# Patient Record
Sex: MALE | Race: WHITE | URBAN - METROPOLITAN AREA
[De-identification: names, ages, dates, MRNs, and addresses within clinical notes are randomized per-mention and may not be internally consistent; named-entity substitution may affect disease eponyms.]

---

## 2024-03-08 ENCOUNTER — HOSPITAL ENCOUNTER (OUTPATIENT)
Dept: PHYSICAL THERAPY | Age: 79
Setting detail: THERAPIES SERIES
Discharge: HOME | End: 2024-03-08
Payer: MEDICARE

## 2024-03-08 DIAGNOSIS — Z78.9 DECREASED ACTIVITIES OF DAILY LIVING (ADL): Primary | ICD-10-CM

## 2024-03-08 DIAGNOSIS — M54.9 BACK PAIN, UNSPECIFIED BACK LOCATION, UNSPECIFIED BACK PAIN LATERALITY, UNSPECIFIED CHRONICITY: ICD-10-CM

## 2024-03-08 PROCEDURE — 97110 THERAPEUTIC EXERCISES: CPT

## 2024-03-08 PROCEDURE — 97161 PT EVAL LOW COMPLEX 20 MIN: CPT

## 2024-03-08 NOTE — PLAN OF CARE
surgeries        Other conditions  [] Vertigo  [] Syncope  [] Kidney Failure  [] Cancer  [] Pregnancy  [] Incontinence   Other Co-morbidities not listed: prostate and bladder surgery      OBJECTIVE EXAMINATION     3/8/24  ROM/Strength: (Blank cells denote NT)      Mvmt (norm) AROM L AROM R Notes PROM L PROM R Notes               LUMBAR Flex (90)         Ext (25)         SB (25)          Rotation (30)                       HIP Flex (120)          Abd (45)          ER (50)          IR (45)          Ext (20)                   KNEE Flex (140)          Ext (0)                     ANKLE DF (20)          PF (50)          Inversion (30)          Eversion (20)             MMT L          MMT  R Notes     LUMBAR  Flexion       Extension       Lateral flexion        Rotation          MMT L MMT R Notes       HIP  Flexion 4-/5 4-/5     ADD 3+/5 3-/5      Abduction 4-/5 4-/5      ER        IR        Extension             KNEE  Flexion 4+/5 4+/5      Extension 4/5 4/5             ANKLE  DF 5/5 5/5      PF 5/5 5/5      Inversion 5/5 5/5      Eversion 5/5 5/5      Repeated Movements: [] Normal  [] Abnormal [] N/A      Reflexes: Abnormal findings listed below  All reflexes WNL (2+); (-) clonus      Balance:  [x] WNL      [] NT       [] Dysfunction noted  Comment:     Falls Risk Assessment (30 days):   Falls Risk assessed and patient requires intervention due to being higher risk   Time Up and Go (TUG):   Not Assessed  21-22 seconds (At least 80% but less than 100% functional limitation) - 21 sec  Tinetti balance 9, gait 3, total 12/28       Exercises/Interventions     Therapeutic Ex (56665)  resistance Sets/time Reps Notes/Cues/Progressions                                                                         Manual Intervention (31868)  TIME                                        NMR re-education (63628) resistance Sets/time Reps CUES NEEDED                                      Therapeutic Activity (89336)  Sets/time

## 2024-03-12 ENCOUNTER — HOSPITAL ENCOUNTER (OUTPATIENT)
Dept: PHYSICAL THERAPY | Age: 79
Setting detail: THERAPIES SERIES
Discharge: HOME | End: 2024-03-12
Payer: MEDICARE

## 2024-03-12 PROCEDURE — 97110 THERAPEUTIC EXERCISES: CPT

## 2024-03-12 PROCEDURE — 97112 NEUROMUSCULAR REEDUCATION: CPT

## 2024-03-12 NOTE — FLOWSHEET NOTE
stayed.\"  Pt stated his back pain contributes to B knee pain- \"If my knee hurts and I straighten my back my knee pain goes away.\"  Pain also is relieved by \"rotating my hip which snaps my back.\"  Pt has difficulty climbing stairs and walking down steps, and the distance pt can walk on flat surfaces is limited.  \"I don't go heel to toe when I walk because of my back.  It affects my balance also... because pain in my knee... my knee sometimes will just give out and it will cause me to fall.\"  Pt falls \"very seldom because I'm very careful in what I do.\"  \"I'll stop what I'm doing if my knees hurt.\"  3/12: [place] good when he left PT, but as the day went on the pain came back       Test used Initial score  3/8/24 03/12/2024   Pain Summary VAS 4/10    Functional questionnaire [place] Back Pain Disability Scale 39    Other:          OBJECTIVE EXAMINATION     3/8/24  ROM/Strength: (Blank cells denote NT)      Mvmt (norm) AROM L AROM R Notes PROM L PROM R Notes               LUMBAR Flex (90)         Ext (25)         SB (25)          Rotation (30)                       HIP Flex (120)          Abd (45)          ER (50)          IR (45)          Ext (20)                   KNEE Flex (140)          Ext (0)                     ANKLE DF (20)          PF (50)          Inversion (30)          Eversion (20)               MMT L MMT R Notes       HIP  Flexion 4-/5 4-/5     ADD 3+/5 3-/5      Abduction 4-/5 4-/5      ER        IR        Extension             KNEE  Flexion 4+/5 4+/5      Extension 4/5 4/5               Exercises/Interventions     Therapeutic Ex (68327)  resistance Sets/time Reps Notes/Cues/Progressions          Nustep L1 5'     Hamstring stretch (seated)  30\" 1 cues   Gastroc incline  30\" 2                                              Manual Intervention (21539)  TIME     STM B SI  3'  Declined ever having tenderness in LB. States it is his knees that hurt   Manual Hamstring stretch in supine  30\" 2

## 2024-03-14 ENCOUNTER — HOSPITAL ENCOUNTER (OUTPATIENT)
Dept: PHYSICAL THERAPY | Age: 79
Setting detail: THERAPIES SERIES
Discharge: HOME | End: 2024-03-14
Payer: MEDICARE

## 2024-03-14 PROCEDURE — 97140 MANUAL THERAPY 1/> REGIONS: CPT

## 2024-03-14 PROCEDURE — 97110 THERAPEUTIC EXERCISES: CPT

## 2024-03-14 PROCEDURE — 97112 NEUROMUSCULAR REEDUCATION: CPT

## 2024-03-14 NOTE — FLOWSHEET NOTE
PLAN     Plan: Cont POC- Continue emphasis/focus on improving proper muscle recruitment and activation/motor control patterns, increasing ROM, static and dynamic balance, and kinesthetic sense and proprioception. Next visit plan to progress balance, continue current phase, and strengthening exercises     Electronically Signed by Nic Hernandez, PT  Date: 03/14/2024     Note: Portions of this note have been templated and/or copied from initial evaluation, reassessments and prior notes for documentation efficiency.

## 2024-03-18 ENCOUNTER — HOSPITAL ENCOUNTER (OUTPATIENT)
Dept: PHYSICAL THERAPY | Age: 79
Setting detail: THERAPIES SERIES
Discharge: HOME | End: 2024-03-18
Payer: MEDICARE

## 2024-03-18 PROCEDURE — 97112 NEUROMUSCULAR REEDUCATION: CPT

## 2024-03-18 PROCEDURE — 97110 THERAPEUTIC EXERCISES: CPT

## 2024-03-18 PROCEDURE — 97140 MANUAL THERAPY 1/> REGIONS: CPT

## 2024-03-18 NOTE — FLOWSHEET NOTE
[facility]- Outpatient Rehabilitation and Therapy [address] [place] [address] office: [phone] fax: [phone]         Physical Therapy: TREATMENT/PROGRESS NOTE   Patient: Estuardo Natarajan Jr (78 y.o. male)   Examination Date: 2024   :  1945 MRN: [id number]   Visit #: 4   Insurance Allowable Auth Needed   25 visits   3/8/24 - 24  Ok to treat, per Teddy [x]Yes    []No    Insurance: Payor: HUMANA MEDICARE / Plan: HUMANA GOLD PLUS HMO / Product Type: *No Product type* /   Insurance ID: [id number] - (Medicare Managed)  Secondary Insurance (if applicable):    Treatment Diagnosis:     ICD-10-CM    1. Decreased activities of daily living (ADL)  Z78.9       2. Back pain, unspecified back location, unspecified back pain laterality, unspecified chronicity  M54.9          Medical Diagnosis:  DDD, lumbar (M51.36)   Referring Physician: Justin Marsh MD  PCP: Brett Edgar       Plan of care signed (Y/N): N    Date of Patient follow up with Physician:      Progress Report/POC: NO  POC update due: (10 visits /OR AUTH LIMITS, whichever is less)  2024                                             Precautions/ Contra-indications:           Latex allergy:  NO  Pacemaker:    NO  Contraindications for Manipulation: NA  Date of Surgery: NA  Other:    Red Flags:  None    C-SSRS Triggered by Intake questionnaire:   [x] No, Questionnaire did not trigger screening.   [] Yes, Patient intake triggered further evaluation      [] C-SSRS Screening completed  [] PCP notified via Plan of Care  [] Emergency services notified     Preferred Language for Healthcare:   [x] English       [] other:    SUBJECTIVE EXAMINATION     Patient stated complaint: Pt has a 4 year history of low back pain.  Pain is located at B SIJ region.  Pt stated he was walking a dog and was tripped over another dog that ran between his legs \"and I got over that, that pain went away.  But then the same thing happened about 2

## 2024-03-21 ENCOUNTER — HOSPITAL ENCOUNTER (OUTPATIENT)
Dept: PHYSICAL THERAPY | Age: 79
Setting detail: THERAPIES SERIES
Discharge: HOME | End: 2024-03-21
Payer: MEDICARE

## 2024-03-21 PROCEDURE — 97110 THERAPEUTIC EXERCISES: CPT

## 2024-03-21 PROCEDURE — 97140 MANUAL THERAPY 1/> REGIONS: CPT

## 2024-03-21 PROCEDURE — 97112 NEUROMUSCULAR REEDUCATION: CPT

## 2024-03-21 NOTE — FLOWSHEET NOTE
[facility]- Outpatient Rehabilitation and Therapy [address] [place] [address] office: [phone] fax: [phone]         Physical Therapy: TREATMENT/PROGRESS NOTE   Patient: Estuardo Natarajan Jr (78 y.o. male)   Examination Date: 2024   :  1945 MRN: [id number]   Visit #: 5   Insurance Allowable Auth Needed   25 visits   3/8/24 - 24  Ok to treat, per Teddy [x]Yes    []No    Insurance: Payor: HUMANA MEDICARE / Plan: HUMANA GOLD PLUS HMO / Product Type: *No Product type* /   Insurance ID: [id number] - (Medicare Managed)  Secondary Insurance (if applicable):    Treatment Diagnosis:     ICD-10-CM    1. Decreased activities of daily living (ADL)  Z78.9       2. Back pain, unspecified back location, unspecified back pain laterality, unspecified chronicity  M54.9          Medical Diagnosis:  DDD, lumbar (M51.36)   Referring Physician: Justin Marsh MD  PCP: Brett Edgar       Plan of care signed (Y/N): N    Date of Patient follow up with Physician:      Progress Report/POC: NO  POC update due: (10 visits /OR AUTH LIMITS, whichever is less)  2024                                             Precautions/ Contra-indications:           Latex allergy:  NO  Pacemaker:    NO  Contraindications for Manipulation: NA  Date of Surgery: NA  Other:    Red Flags:  None    C-SSRS Triggered by Intake questionnaire:   [x] No, Questionnaire did not trigger screening.   [] Yes, Patient intake triggered further evaluation      [] C-SSRS Screening completed  [] PCP notified via Plan of Care  [] Emergency services notified     Preferred Language for Healthcare:   [x] English       [] other:    SUBJECTIVE EXAMINATION     Patient stated complaint: Pt has a 4 year history of low back pain.  Pain is located at B SIJ region.  Pt stated he was walking a dog and was tripped over another dog that ran between his legs \"and I got over that, that pain went away.  But then the same thing happened about 2

## 2024-03-26 ENCOUNTER — HOSPITAL ENCOUNTER (OUTPATIENT)
Dept: PHYSICAL THERAPY | Age: 79
Setting detail: THERAPIES SERIES
Discharge: HOME | End: 2024-03-26
Payer: MEDICARE

## 2024-03-26 PROCEDURE — 97110 THERAPEUTIC EXERCISES: CPT

## 2024-03-26 PROCEDURE — 97112 NEUROMUSCULAR REEDUCATION: CPT

## 2024-03-26 PROCEDURE — 97140 MANUAL THERAPY 1/> REGIONS: CPT

## 2024-03-26 NOTE — FLOWSHEET NOTE
[facility]- Outpatient Rehabilitation and Therapy [address] [place] [address] office: [phone] fax: [phone]         Physical Therapy: TREATMENT/PROGRESS NOTE   Patient: Estuardo Natarajan Jr (78 y.o. male)   Examination Date: 2024   :  1945 MRN: [id number]   Visit #: 6   Insurance Allowable Auth Needed   25 visits   3/8/24 - 24  Ok to treat, per Teddy [x]Yes    []No    Insurance: Payor: HUMANA MEDICARE / Plan: HUMANA GOLD PLUS HMO / Product Type: *No Product type* /   Insurance ID: [id number] - (Medicare Managed)  Secondary Insurance (if applicable):    Treatment Diagnosis:     ICD-10-CM    1. Decreased activities of daily living (ADL)  Z78.9       2. Back pain, unspecified back location, unspecified back pain laterality, unspecified chronicity  M54.9          Medical Diagnosis:  DDD, lumbar (M51.36)   Referring Physician: Justin Marsh MD  PCP: Brett Edgar       Plan of care signed (Y/N): N    Date of Patient follow up with Physician:      Progress Report/POC: NO  POC update due: (10 visits /OR AUTH LIMITS, whichever is less)  2024                                             Precautions/ Contra-indications:           Latex allergy:  NO  Pacemaker:    NO  Contraindications for Manipulation: NA  Date of Surgery: NA  Other:    Red Flags:  None    C-SSRS Triggered by Intake questionnaire:   [x] No, Questionnaire did not trigger screening.   [] Yes, Patient intake triggered further evaluation      [] C-SSRS Screening completed  [] PCP notified via Plan of Care  [] Emergency services notified     Preferred Language for Healthcare:   [x] English       [] other:    SUBJECTIVE EXAMINATION     Patient stated complaint: Pt has a 4 year history of low back pain.  Pain is located at B SIJ region.  Pt stated he was walking a dog and was tripped over another dog that ran between his legs \"and I got over that, that pain went away.  But then the same thing happened about 2

## 2024-03-28 ENCOUNTER — HOSPITAL ENCOUNTER (OUTPATIENT)
Dept: PHYSICAL THERAPY | Age: 79
Setting detail: THERAPIES SERIES
Discharge: HOME | End: 2024-03-28
Payer: MEDICARE

## 2024-03-28 PROCEDURE — 97140 MANUAL THERAPY 1/> REGIONS: CPT

## 2024-03-28 PROCEDURE — 97110 THERAPEUTIC EXERCISES: CPT

## 2024-03-28 PROCEDURE — 97112 NEUROMUSCULAR REEDUCATION: CPT

## 2024-03-28 PROCEDURE — 97530 THERAPEUTIC ACTIVITIES: CPT

## 2024-03-28 NOTE — FLOWSHEET NOTE
[facility]- Outpatient Rehabilitation and Therapy [address] [place] [address] office: [phone] fax: [phone]         Physical Therapy: TREATMENT/PROGRESS NOTE   Patient: Estuardo Natarajan Jr (78 y.o. male)   Examination Date: 2024   :  1945 MRN: [id number]   Visit #: 7   Insurance Allowable Auth Needed   25 visits   3/8/24 - 24  Ok to treat, per Teddy [x]Yes    []No    Insurance: Payor: HUMANA MEDICARE / Plan: HUMANA GOLD PLUS HMO / Product Type: *No Product type* /   Insurance ID: [id number] - (Medicare Managed)  Secondary Insurance (if applicable):    Treatment Diagnosis:     ICD-10-CM    1. Decreased activities of daily living (ADL)  Z78.9       2. Back pain, unspecified back location, unspecified back pain laterality, unspecified chronicity  M54.9          Medical Diagnosis:  DDD, lumbar (M51.36)   Referring Physician: Justin Marsh MD  PCP: Brett Edgar       Plan of care signed (Y/N): N    Date of Patient follow up with Physician:      Progress Report/POC: NO  POC update due: (10 visits /OR AUTH LIMITS, whichever is less)  2024                                             Precautions/ Contra-indications:           Latex allergy:  NO  Pacemaker:    NO  Contraindications for Manipulation: NA  Date of Surgery: NA  Other:    Red Flags:  None    C-SSRS Triggered by Intake questionnaire:   [x] No, Questionnaire did not trigger screening.   [] Yes, Patient intake triggered further evaluation      [] C-SSRS Screening completed  [] PCP notified via Plan of Care  [] Emergency services notified     Preferred Language for Healthcare:   [x] English       [] other:    SUBJECTIVE EXAMINATION     Patient stated complaint: Pt has a 4 year history of low back pain.  Pain is located at B SIJ region.  Pt stated he was walking a dog and was tripped over another dog that ran between his legs \"and I got over that, that pain went away.  But then the same thing happened about 2

## 2024-04-03 ENCOUNTER — HOSPITAL ENCOUNTER (OUTPATIENT)
Dept: PHYSICAL THERAPY | Age: 79
Setting detail: THERAPIES SERIES
Discharge: HOME | End: 2024-04-03
Payer: MEDICARE

## 2024-04-03 PROCEDURE — 97140 MANUAL THERAPY 1/> REGIONS: CPT

## 2024-04-03 PROCEDURE — 97110 THERAPEUTIC EXERCISES: CPT

## 2024-04-03 PROCEDURE — 97112 NEUROMUSCULAR REEDUCATION: CPT

## 2024-04-03 PROCEDURE — 97530 THERAPEUTIC ACTIVITIES: CPT

## 2024-04-03 NOTE — FLOWSHEET NOTE
[facility]- Outpatient Rehabilitation and Therapy [address] [place] [address] office: [phone] fax: [phone]         Physical Therapy: TREATMENT/PROGRESS NOTE   Patient: Estuardo Natarajan Jr (78 y.o. male)   Examination Date: 2024   :  1945 MRN: [id number]   Visit #: 8   Insurance Allowable Auth Needed   25 visits   3/8/24 - 24  Ok to treat, per Teddy [x]Yes    []No    Insurance: Payor: HUMANA MEDICARE / Plan: HUMANA GOLD PLUS HMO / Product Type: *No Product type* /   Insurance ID: [id number] - (Medicare Managed)  Secondary Insurance (if applicable):    Treatment Diagnosis:     ICD-10-CM    1. Decreased activities of daily living (ADL)  Z78.9       2. Back pain, unspecified back location, unspecified back pain laterality, unspecified chronicity  M54.9          Medical Diagnosis:  DDD, lumbar (M51.36)   Referring Physician: Justin Marsh MD  PCP: Brett Edgar       Plan of care signed (Y/N): N    Date of Patient follow up with Physician:      Progress Report/POC: NO  POC update due: (10 visits /OR AUTH LIMITS, whichever is less)  5/3/2024                                             Precautions/ Contra-indications:           Latex allergy:  NO  Pacemaker:    NO  Contraindications for Manipulation: NA  Date of Surgery: NA  Other:    Red Flags:  None    C-SSRS Triggered by Intake questionnaire:   [x] No, Questionnaire did not trigger screening.   [] Yes, Patient intake triggered further evaluation      [] C-SSRS Screening completed  [] PCP notified via Plan of Care  [] Emergency services notified     Preferred Language for Healthcare:   [x] English       [] other:    SUBJECTIVE EXAMINATION     Patient stated complaint: Pt has a 4 year history of low back pain.  Pain is located at B SIJ region.  Pt stated he was walking a dog and was tripped over another dog that ran between his legs \"and I got over that, that pain went away.  But then the same thing happened about 2

## 2024-04-05 ENCOUNTER — HOSPITAL ENCOUNTER (OUTPATIENT)
Dept: PHYSICAL THERAPY | Age: 79
Setting detail: THERAPIES SERIES
Discharge: HOME | End: 2024-04-05
Payer: MEDICARE

## 2024-04-05 PROCEDURE — 97112 NEUROMUSCULAR REEDUCATION: CPT

## 2024-04-05 PROCEDURE — 97110 THERAPEUTIC EXERCISES: CPT

## 2024-04-05 PROCEDURE — 97530 THERAPEUTIC ACTIVITIES: CPT

## 2024-04-05 NOTE — FLOWSHEET NOTE
[facility]- Outpatient Rehabilitation and Therapy [address] [place] [address] office: [phone] fax: [phone]         Physical Therapy: TREATMENT/PROGRESS NOTE   Patient: Estuardo Natarajan Jr (78 y.o. male)   Examination Date: 2024   :  1945 MRN: [id number]   Visit #: 9   Insurance Allowable Auth Needed   25 visits   3/8/24 - 24  Ok to treat, per Teddy [x]Yes    []No    Insurance: Payor: HUMANA MEDICARE / Plan: HUMANA GOLD PLUS HMO / Product Type: *No Product type* /   Insurance ID: [id number] - (Medicare Managed)  Secondary Insurance (if applicable):    Treatment Diagnosis:     ICD-10-CM    1. Decreased activities of daily living (ADL)  Z78.9       2. Back pain, unspecified back location, unspecified back pain laterality, unspecified chronicity  M54.9          Medical Diagnosis:  DDD, lumbar (M51.36)   Referring Physician: Justin Marsh MD  PCP: Brett Edgar       Plan of care signed (Y/N): N    Date of Patient follow up with Physician:      Progress Report/POC: NO  POC update due: (10 visits /OR AUTH LIMITS, whichever is less)  5/3/2024                                             Precautions/ Contra-indications:           Latex allergy:  NO  Pacemaker:    NO  Contraindications for Manipulation: NA  Date of Surgery: NA  Other:    Red Flags:  None    C-SSRS Triggered by Intake questionnaire:   [x] No, Questionnaire did not trigger screening.   [] Yes, Patient intake triggered further evaluation      [] C-SSRS Screening completed  [] PCP notified via Plan of Care  [] Emergency services notified     Preferred Language for Healthcare:   [x] English       [] other:    SUBJECTIVE EXAMINATION     Patient stated complaint: Pt has a 4 year history of low back pain.  Pain is located at B SIJ region.  Pt stated he was walking a dog and was tripped over another dog that ran between his legs \"and I got over that, that pain went away.  But then the same thing happened about 2

## 2024-04-09 ENCOUNTER — HOSPITAL ENCOUNTER (OUTPATIENT)
Dept: PHYSICAL THERAPY | Age: 79
Setting detail: THERAPIES SERIES
Discharge: HOME | End: 2024-04-09
Payer: MEDICARE

## 2024-04-09 PROCEDURE — 97110 THERAPEUTIC EXERCISES: CPT

## 2024-04-09 PROCEDURE — 97530 THERAPEUTIC ACTIVITIES: CPT

## 2024-04-09 PROCEDURE — 97112 NEUROMUSCULAR REEDUCATION: CPT

## 2024-04-09 NOTE — FLOWSHEET NOTE
cueing for activities related to strengthening, flexibility, endurance, ROM performed to prevent loss of range of motion, maintain or improve muscular strength or increase flexibility, following either an injury or surgery.   (67515) NEUROMUSCULAR RE-EDUCATION - Therapeutic procedure, 1 or more areas, each 15 minutes; neuromuscular reeducation of movement, balance, coordination, kinesthetic sense, posture, and/or proprioception for sitting and/or standing activities  (49699) THERAPEUTIC ACTIVITY - use of dynamic activities to improve functional performance. (Ex include squatting, ascending/descending stairs, walking, bending, lifting, catching, throwing, pushing, pulling, jumping.)  Direct, one on one contact, billed in 15-minute increments.  (90923) MANUAL THERAPY -  Manual therapy techniques, 1 or more regions, each 15 minutes (Mobilization/manipulation, manual lymphatic drainage, manual traction) for the purpose of modulating pain, promoting relaxation,  increasing ROM, reducing/eliminating soft tissue swelling/inflammation/restriction, improving soft tissue extensibility and allowing for proper ROM for normal function with self care, mobility, lifting and ambulation  (00797) MECHANICAL TRACTION      GOALS     Patient stated goal: \"walking, climbing stairs\"  Status:  [] Progressing: [] Met: [] Not Met: [] Adjusted    Therapist goals for Patient:   Short Term Goals: To be achieved in: 2 weeks  Independent in HEP and progression per patient tolerance, in order to progress toward full function and prevent re-injury.    Status: [] Progressing: [] Met: [] Not Met: [] Adjusted  Patient will have a decrease in pain to 3/10 to help facilitate improvement in movement, function, and ADLs as indicated by functional deficits.   Status: [] Progressing: [] Met: [] Not Met: [] Adjusted    Long Term Goals: To be achieved in: 8-10 weeks  Disability index score of 20% or less for the [place] Back Pain Disability Scale to assist

## 2024-04-11 ENCOUNTER — HOSPITAL ENCOUNTER (OUTPATIENT)
Dept: PHYSICAL THERAPY | Age: 79
Setting detail: THERAPIES SERIES
Discharge: HOME | End: 2024-04-11
Payer: MEDICARE

## 2024-04-11 PROCEDURE — 97530 THERAPEUTIC ACTIVITIES: CPT

## 2024-04-11 PROCEDURE — 97110 THERAPEUTIC EXERCISES: CPT

## 2024-04-11 PROCEDURE — 97112 NEUROMUSCULAR REEDUCATION: CPT

## 2024-04-11 NOTE — FLOWSHEET NOTE
[facility]- Outpatient Rehabilitation and Therapy [address] [place] [address] office: [phone] fax: [phone]         Physical Therapy: TREATMENT/PROGRESS NOTE   Patient: Estuardo Natarajan Jr (78 y.o. male)   Examination Date: 2024   :  1945 MRN: [id number]   Visit #: 11   Insurance Allowable Auth Needed   25 visits   3/8/24 - 24  Ok to treat, per Teddy [x]Yes    []No    Insurance: Payor: HUMANA MEDICARE / Plan: HUMANA GOLD PLUS HMO / Product Type: *No Product type* /   Insurance ID: [id number] - (Medicare Managed)  Secondary Insurance (if applicable):    Treatment Diagnosis:     ICD-10-CM    1. Decreased activities of daily living (ADL)  Z78.9       2. Back pain, unspecified back location, unspecified back pain laterality, unspecified chronicity  M54.9          Medical Diagnosis:  DDD, lumbar (M51.36)   Referring Physician: Justin Marsh MD  PCP: Brett Edgar       Plan of care signed (Y/N): N    Date of Patient follow up with Physician:      Progress Report/POC: NO  POC update due: (10 visits /OR AUTH LIMITS, whichever is less)  5/10/2024                                             Precautions/ Contra-indications:           Latex allergy:  NO  Pacemaker:    NO  Contraindications for Manipulation: NA  Date of Surgery: NA  Other:    Red Flags:  None    C-SSRS Triggered by Intake questionnaire:   [x] No, Questionnaire did not trigger screening.   [] Yes, Patient intake triggered further evaluation      [] C-SSRS Screening completed  [] PCP notified via Plan of Care  [] Emergency services notified     Preferred Language for Healthcare:   [x] English       [] other:    SUBJECTIVE EXAMINATION     Patient stated complaint: Pt has a 4 year history of low back pain.  Pain is located at B SIJ region.  Pt stated he was walking a dog and was tripped over another dog that ran between his legs \"and I got over that, that pain went away.  But then the same thing happened about 2

## 2024-04-16 ENCOUNTER — APPOINTMENT (OUTPATIENT)
Dept: PHYSICAL THERAPY | Age: 79
End: 2024-04-16
Payer: MEDICARE

## 2024-04-17 ENCOUNTER — HOSPITAL ENCOUNTER (OUTPATIENT)
Dept: PHYSICAL THERAPY | Age: 79
Setting detail: THERAPIES SERIES
Discharge: HOME | End: 2024-04-17
Payer: MEDICARE

## 2024-04-17 PROCEDURE — 97110 THERAPEUTIC EXERCISES: CPT

## 2024-04-17 PROCEDURE — 97530 THERAPEUTIC ACTIVITIES: CPT

## 2024-04-17 PROCEDURE — 97112 NEUROMUSCULAR REEDUCATION: CPT

## 2024-04-17 NOTE — FLOWSHEET NOTE
EXAMINATION     3/8/24  ROM/Strength: (Blank cells denote NT)           MMT L  3/8/24 MMT R  3/8/24 MMT L  3/28/24   MMT R  3/28/24       HIP  Flexion 4-/5 4-/5 5/5 5/5    ADD 3+/5 3-/5 4+/5 4+/5     Abduction 4-/5 4-/5 4+/5 4+/5     ER         IR         Extension               KNEE  Flexion 4+/5 4+/5 5/5 5/5     Extension 4/5 4/5 5/5 5/5               Exercises/Interventions     Therapeutic Ex (96548)  resistance Sets/time Reps Notes/Cues/Progressions          Nustep L1 6'     Hamstring stretch (seated)  30\" 3    Gastroc with strap (seated)  30\" 2    Seated flexion stretch w/ ball  Forward  Lateral L/R    10\"  10\"   5  5    LTR stretch  30\" 2x    LTR with t-band resistance L/R Blue 1 30    Bridge with ADD set       Bridge with ABD vs blue band              Manual Intervention (18471)  TIME                                               NMR re-education (27091) resistance Sets/time Reps CUES NEEDED          ROSMERY   Knee extension  Knee flexion 7/8   5\"  5\"   10  10    Concentric quads  Concentric hamstrings 15 kg, 20 kg  15 kg, 20 kg 2  2 15  15       Standing Hip abd                 Hip flex 3#  3# 1  1 20  25    Long sit hip IR/ER   30 R/L           Hooklying abd w/ band Blue 1 30 Cues for controlled speed   Hooklying march 3# 1 30                  Therapeutic Activity (12777)  Sets/time     T-band trunk  Flexion  Rotation L/R   [place]    30  30    T-band scapular  Rows   Orange    30    Amb over gumdrops forward march  10x, 4 gumdrops  CGA, pt held treatment plinth   Amb over gum drops lateral  5x L/R, 4 gum drops  CGA, pt held treatment plinth            Modalities:    No modalities applied this session    Education/Home Exercise Program:     Access Code: [email or web address]  URL: [email or web address]  Date: 04/05/2024  Prepared by: Nic Hernandez    Exercises  - Supine Hip Adduction Isometric with Ball  - 1 x daily - 7 x weekly - 10 reps - 5 hold  - Supine Quadricep Sets  - 1 x daily - 7 x weekly - 10 reps - 5

## 2024-04-18 ENCOUNTER — APPOINTMENT (OUTPATIENT)
Dept: PHYSICAL THERAPY | Age: 79
End: 2024-04-18
Payer: MEDICARE

## 2024-04-19 ENCOUNTER — HOSPITAL ENCOUNTER (OUTPATIENT)
Dept: PHYSICAL THERAPY | Age: 79
Setting detail: THERAPIES SERIES
Discharge: HOME | End: 2024-04-19
Payer: MEDICARE

## 2024-04-19 PROCEDURE — 97530 THERAPEUTIC ACTIVITIES: CPT

## 2024-04-19 PROCEDURE — 97110 THERAPEUTIC EXERCISES: CPT

## 2024-04-19 PROCEDURE — 97112 NEUROMUSCULAR REEDUCATION: CPT

## 2024-04-19 NOTE — FLOWSHEET NOTE
Status: [] Progressing: [] Met: [] Not Met: [] Adjusted  Patient will have a decrease in pain to 3/10 to help facilitate improvement in movement, function, and ADLs as indicated by functional deficits.   Status: [] Progressing: [] Met: [] Not Met: [] Adjusted    Long Term Goals: To be achieved in: 8-10 weeks  Disability index score of 20% or less for the [place] Back Pain Disability Scale to assist with return top prior level of function.   Status: [] Progressing: [] Met: [] Not Met: [] Adjusted  Pt will demonstrate improving balance by scoring 18 or greater per Tinetti.  Status: [] Progressing: [] Met: [] Not Met: [] Adjusted  Pt to improve strength to show motor control/activation of proximal hip to facilitate functional mobility and ADLs.    Status: [] Progressing: [] Met: [] Not Met: [] Adjusted  Patient will return to  walk 1 mile  without increased symptoms or restriction to work towards return to prior level of function.        Status: [] Progressing: [] Met: [] Not Met: [] Adjusted  Patient will be able to climb stairs without difficulty.           Status: [] Progressing: [] Met: [] Not Met: [] Adjusted    TREATMENT PLAN     Plan: Alter current plan:  pt wants to increase his trunk strength and his mobility and wants to prevent his back and knees from giving out.  Progress strengthening of B LE and trunk, progress balance as tolerated, less emphasis on trunk flexibility. Consider adding stand-pivots for balance    Electronically Signed by Ivania Anderson, BRIT  Date: 04/19/2024     Note: Portions of this note have been templated and/or copied from initial evaluation, reassessments and prior notes for documentation efficiency.

## 2024-04-23 ENCOUNTER — HOSPITAL ENCOUNTER (OUTPATIENT)
Dept: PHYSICAL THERAPY | Age: 79
Setting detail: THERAPIES SERIES
Discharge: HOME | End: 2024-04-23
Payer: MEDICARE

## 2024-04-23 PROCEDURE — 97110 THERAPEUTIC EXERCISES: CPT

## 2024-04-23 PROCEDURE — 97112 NEUROMUSCULAR REEDUCATION: CPT

## 2024-04-23 PROCEDURE — 97530 THERAPEUTIC ACTIVITIES: CPT

## 2024-04-23 NOTE — FLOWSHEET NOTE
EVAL:LOW (49110 - Typically 20 minutes face-to-face)     Neuromusc. Re-ed (20730) 15 1  Re-Eval (57852)     Manual (73919)    Estim Unattended (82436)     Ther. Act (55529) 15 1  Mech. Traction (93486)     Gait (04894)    Dry Needle 1-2 muscle (17864)     Aquatic Therex (64586)    Dry Needle 3+ muscle (20561)     Iontophoresis (65252)    VASO (19057)     Ultrasound (79891)    Group Therapy (71394)     Estim Attended (94858)    Canalith Repositioning (77604)     Other:    Other:    Total Timed Code Tx Minutes 55 4       Total Treatment Minutes 55        Charge Justification:  (03449) THERAPEUTIC EXERCISE - Provided verbal/tactile cueing for activities related to strengthening, flexibility, endurance, ROM performed to prevent loss of range of motion, maintain or improve muscular strength or increase flexibility, following either an injury or surgery.   (32616) NEUROMUSCULAR RE-EDUCATION - Therapeutic procedure, 1 or more areas, each 15 minutes; neuromuscular reeducation of movement, balance, coordination, kinesthetic sense, posture, and/or proprioception for sitting and/or standing activities  (23815) THERAPEUTIC ACTIVITY - use of dynamic activities to improve functional performance. (Ex include squatting, ascending/descending stairs, walking, bending, lifting, catching, throwing, pushing, pulling, jumping.)  Direct, one on one contact, billed in 15-minute increments.  (27188) MANUAL THERAPY -  Manual therapy techniques, 1 or more regions, each 15 minutes (Mobilization/manipulation, manual lymphatic drainage, manual traction) for the purpose of modulating pain, promoting relaxation,  increasing ROM, reducing/eliminating soft tissue swelling/inflammation/restriction, improving soft tissue extensibility and allowing for proper ROM for normal function with self care, mobility, lifting and ambulation      GOALS     Patient stated goal: \"walking, climbing stairs\"  Status:  [] Progressing: [] Met: [] Not Met: []

## 2024-04-25 ENCOUNTER — HOSPITAL ENCOUNTER (OUTPATIENT)
Dept: PHYSICAL THERAPY | Age: 79
Setting detail: THERAPIES SERIES
End: 2024-04-25
Payer: MEDICARE

## 2024-04-29 ENCOUNTER — HOSPITAL ENCOUNTER (OUTPATIENT)
Dept: PHYSICAL THERAPY | Age: 79
Setting detail: THERAPIES SERIES
Discharge: HOME | End: 2024-04-29
Payer: MEDICARE

## 2024-04-29 PROCEDURE — 97530 THERAPEUTIC ACTIVITIES: CPT

## 2024-04-29 PROCEDURE — 97112 NEUROMUSCULAR REEDUCATION: CPT

## 2024-04-29 PROCEDURE — 97110 THERAPEUTIC EXERCISES: CPT

## 2024-04-29 NOTE — FLOWSHEET NOTE
[facility], [place]  [address] [place] [address]  Phone: [phone]   Fax:     [phone]       Physical Therapy Discharge Summary    Dear Dr. Justin Marsh,    We had the pleasure of treating the following patient for physical therapy services at [facility].  A summary of our findings can be found in the discharge summary below.  If you have any questions or concerns regarding these findings, please do not hesitate to contact me at the office phone number above.  Thank you for the referral.     Physician Signature:________________________________Date:__________________  By signing above (or electronic signature), therapist’s plan is approved by physician      Overall Response to Treatment:   []Patient is responding well to treatment and improvement is noted with regards  to goals   []Patient should continue to improve in reasonable time if they continue HEP   []Patient has plateaued and is no longer responding to skilled PT intervention    []Patient is getting worse and would benefit from return to referring MD   []Patient unable to adhere to initial POC   [x]Other: Function improved per [place] from 39% dysfunction to 11% dysfunction.  Balance and mobility improved per TUG and Tinetti. Strength B LE improved to good to normal range. Pt is ready for discharge from PT.    Date range of Visits: 3/8/24-24  Total Visits: 15         [facility] [address] [place] [address] office: [phone] fax: [phone]         Physical Therapy: TREATMENT/PROGRESS NOTE   Patient: Estuardo Natarajan Jr (78 y.o. male)   Examination Date: 2024   :  1945 MRN: [id number]   Visit #: 15   Insurance Allowable Auth Needed   25 visits   3/8/24 - 24  Ok to treat, per Teddy [x]Yes    []No    Insurance: Payor: [employer] MEDICARE / Plan: